# Patient Record
Sex: MALE | Race: WHITE | NOT HISPANIC OR LATINO | URBAN - METROPOLITAN AREA
[De-identification: names, ages, dates, MRNs, and addresses within clinical notes are randomized per-mention and may not be internally consistent; named-entity substitution may affect disease eponyms.]

---

## 2024-11-13 ENCOUNTER — TRANSCRIBE ORDERS (OUTPATIENT)
Dept: ADMINISTRATIVE | Facility: HOSPITAL | Age: 49
End: 2024-11-13
Payer: COMMERCIAL

## 2024-11-13 DIAGNOSIS — R26.89 LOSS OF BALANCE: Primary | ICD-10-CM

## 2024-11-15 ENCOUNTER — TELEPHONE (OUTPATIENT)
Dept: INFUSION THERAPY | Facility: HOSPITAL | Age: 49
End: 2024-11-15
Payer: COMMERCIAL

## 2024-11-15 RX ORDER — BICTEGRAVIR SODIUM, EMTRICITABINE, AND TENOFOVIR ALAFENAMIDE FUMARATE 50; 200; 25 MG/1; MG/1; MG/1
TABLET ORAL DAILY
COMMUNITY

## 2024-11-15 RX ORDER — ESCITALOPRAM OXALATE 10 MG/1
10 TABLET ORAL DAILY
COMMUNITY

## 2024-11-15 NOTE — TELEPHONE ENCOUNTER
Mr. Tripathi contacted outpatient prep and recovery with questions in regards to his scheduled lumbar puncture planned for 11/18/24. Reviewed with patient arrival time, location,  needed, denies blood thinners, nothing to eat after midnight but clear liquids okay, may take medications the morning of the procedure but use caution with hypoglycemic medications until after allowed to eat. If procedure scheduled for afternoon okay to eat a light breakfast prior to 8 am. Plan on being here for procedure 4-5 hours so wear comfortable clothes. Reviewed medical history, medications, allergies and allowed time for questions. Mr. Tripathi voiced his specific request that the reason for his lumbar puncture not be discussed in front of his family person/ while he is here on Monday.

## 2024-11-18 ENCOUNTER — HOSPITAL ENCOUNTER (OUTPATIENT)
Dept: GENERAL RADIOLOGY | Facility: HOSPITAL | Age: 49
Discharge: HOME | End: 2024-11-18
Payer: COMMERCIAL